# Patient Record
Sex: MALE | Race: WHITE | Employment: FULL TIME | URBAN - METROPOLITAN AREA
[De-identification: names, ages, dates, MRNs, and addresses within clinical notes are randomized per-mention and may not be internally consistent; named-entity substitution may affect disease eponyms.]

---

## 2018-12-12 ENCOUNTER — OFFICE VISIT (OUTPATIENT)
Dept: FAMILY MEDICINE CLINIC | Age: 23
End: 2018-12-12
Payer: COMMERCIAL

## 2018-12-12 VITALS
HEIGHT: 71 IN | DIASTOLIC BLOOD PRESSURE: 85 MMHG | HEART RATE: 81 BPM | SYSTOLIC BLOOD PRESSURE: 135 MMHG | OXYGEN SATURATION: 98 % | BODY MASS INDEX: 39.2 KG/M2 | WEIGHT: 280 LBS

## 2018-12-12 DIAGNOSIS — K60.2 RECTAL FISSURE: ICD-10-CM

## 2018-12-12 DIAGNOSIS — R10.30 LOWER ABDOMINAL PAIN: Primary | ICD-10-CM

## 2018-12-12 DIAGNOSIS — K62.5 RECTAL BLEEDING: ICD-10-CM

## 2018-12-12 PROCEDURE — 99203 OFFICE O/P NEW LOW 30 MIN: CPT | Performed by: FAMILY MEDICINE

## 2018-12-12 ASSESSMENT — PATIENT HEALTH QUESTIONNAIRE - PHQ9
SUM OF ALL RESPONSES TO PHQ9 QUESTIONS 1 & 2: 0
SUM OF ALL RESPONSES TO PHQ QUESTIONS 1-9: 0
2. FEELING DOWN, DEPRESSED OR HOPELESS: 0
1. LITTLE INTEREST OR PLEASURE IN DOING THINGS: 0
SUM OF ALL RESPONSES TO PHQ QUESTIONS 1-9: 0

## 2018-12-12 ASSESSMENT — ENCOUNTER SYMPTOMS
ABDOMINAL PAIN: 1
SHORTNESS OF BREATH: 0
ANAL BLEEDING: 1
COUGH: 0

## 2018-12-12 NOTE — PROGRESS NOTES
Subjective:      Patient ID: Mark Gates is a 21 y.o. male. 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 No pertinent past medical history. Review of Systems    Review of Systems   Constitutional: Negative for unexpected weight change. HENT: Negative for congestion and postnasal drip. Respiratory: Negative for cough and shortness of breath. Cardiovascular: Negative for chest pain and palpitations. Gastrointestinal: Positive for abdominal pain and anal bleeding. Genitourinary: Negative for difficulty urinating and hematuria. Musculoskeletal: Negative for arthralgias. Neurological: Negative for tremors and headaches. Psychiatric/Behavioral: Negative for sleep disturbance. The patient is not nervous/anxious. Objective:   Physical Exam     Physical Exam   Constitutional: He is oriented to person, place, and time. HENT:   Mouth/Throat: Oropharynx is clear and moist.   Eyes: Conjunctivae are normal.   Neck: Neck supple. No thyromegaly present. Cardiovascular: Normal rate, regular rhythm and normal heart sounds. Pulmonary/Chest: Effort normal and breath sounds normal.   Abdominal: Soft. He exhibits no distension and no mass. There is no tenderness. Musculoskeletal: Normal range of motion. Lymphadenopathy:     He has no cervical adenopathy. Neurological: He is alert and oriented to person, place, and time. Skin: Skin is warm and dry. Psychiatric: He has a normal mood and affect. His behavior is normal. Judgment and thought content normal.       Assessment:      1. Lower abdominal pain    2. Rectal bleeding    3. Rectal fissure              Plan:      Jorge Herr was seen today for established new doctor and gi problem.     Diagnoses and all orders for this visit:    Lower abdominal pain   Senna-take 1 daily-call me 10 days  Rectal bleeding  Rx HC cream twice  Rectal fissure  As above  Refer to rectal Sx              Joshua Parson, DO

## 2018-12-12 NOTE — PATIENT INSTRUCTIONS
Instructions. \"     If you do not have an account, please click on the \"Sign Up Now\" link. Current as of: March 28, 2018  Content Version: 11.8  © 6233-3490 [employer]. Care instructions adapted under license by [facility] [place] ([facility]). If you have questions about a medical condition or this instruction, always ask your healthcare professional. [address] any warranty or liability for your use of this information.

## 2023-10-13 ENCOUNTER — OFFICE VISIT (OUTPATIENT)
Age: 28
End: 2023-10-13
Payer: COMMERCIAL

## 2023-10-13 VITALS
BODY MASS INDEX: 42.29 KG/M2 | HEIGHT: 71 IN | HEART RATE: 94 BPM | DIASTOLIC BLOOD PRESSURE: 88 MMHG | WEIGHT: 302.1 LBS | SYSTOLIC BLOOD PRESSURE: 138 MMHG | OXYGEN SATURATION: 97 %

## 2023-10-13 DIAGNOSIS — Z00.00 HEALTHCARE MAINTENANCE: ICD-10-CM

## 2023-10-13 DIAGNOSIS — F41.9 ANXIETY: Primary | ICD-10-CM

## 2023-10-13 NOTE — PROGRESS NOTES
"    Office Note     Name: Dean Wade    : 1995     MRN: [id number]     Chief Complaint  Anxiety and Depression    Subjective     History of Present Illness:  Dean Wade is a 28 y.o. male who presents today for initial visit to establish care chronic anxiety and for his preventative health care.  Patient reports debilitating anxiety that limit his function in life as well as some more mild depression symptoms.  Denies any SI.  His anxiety is on most days and is more generalized than episodic.    Past Medical History:   Past Medical History:   Diagnosis Date    Anxiety     Hyperlipidemia        Past Surgical History: History reviewed. No pertinent surgical history.    Immunizations:   There is no immunization history on file for this patient.     Medications:     Current Outpatient Medications:     sertraline (ZOLOFT) 50 MG tablet, Take 1 tablet by mouth Daily., Disp: 60 tablet, Rfl: 3    Allergies:   No Known Allergies    Family History:   Family History   Problem Relation Age of Onset    Heart disease Maternal Grandmother        Social History:   Social History     Socioeconomic History    Marital status:    Tobacco Use    Smoking status: Never    Smokeless tobacco: Never   Vaping Use    Vaping Use: Never used   Substance and Sexual Activity    Alcohol use: Not Currently    Drug use: Never    Sexual activity: Yes     Partners: Female     Birth control/protection: None         Objective     Vital Signs  /88   Pulse 94   Ht 181 cm (71.26\")   Wt (!) 137 kg (302 lb 1.6 oz)   SpO2 97%   BMI 41.83 kg/mý   Estimated body mass index is 41.83 kg/mý as calculated from the following:    Height as of this encounter: 181 cm (71.26\").    Weight as of this encounter: 137 kg (302 lb 1.6 oz).    Class 3 Severe Obesity (BMI >=40).  Not fully addressed at this visit      Physical Exam  Constitutional:       General: He is not in acute distress.     Appearance: He is not toxic-appearing. "   Cardiovascular:      Rate and Rhythm: Normal rate and regular rhythm.      Heart sounds: No murmur heard.     No friction rub. No gallop.   Pulmonary:      Effort: Pulmonary effort is normal.      Breath sounds: Normal breath sounds.   Abdominal:      General: Abdomen is flat. There is no distension.   Skin:     General: Skin is warm and dry.   Neurological:      Mental Status: He is alert.   Psychiatric:         Mood and Affect: Mood normal.         Behavior: Behavior normal.          Assessment and Plan     1. Anxiety  Chronic, uncontrolled  -No SI, some mild depression  -We will start Zoloft 50 mg, patient instructed that he can increase to 100 mg prior to next appointment if he is tolerating this dose well after couple weeks  -Counseled him on this medication and its side effects  - sertraline (ZOLOFT) 50 MG tablet; Take 1 tablet by mouth Daily.  Dispense: 60 tablet; Refill: 3       Counseling was given to patient for the following topics: instructions for management.    Follow Up  Return in about 6 weeks (around 11/24/2023) for Follow Up.    MD ERMA [facility]  [address] [address]  [phone]

## 2023-11-16 DIAGNOSIS — F41.9 ANXIETY: ICD-10-CM

## 2023-11-16 NOTE — TELEPHONE ENCOUNTER
Caller: Dean Wade    Relationship: Self    Best call back number: [phone]    Requested Prescriptions:   Requested Prescriptions     Pending Prescriptions Disp Refills    sertraline (ZOLOFT) 50 MG tablet 60 tablet 3     Sig: Take 1 tablet by mouth Daily.        Pharmacy where request should be sent: [facility] [id number] [phone] [facility] [phone]      Last office visit with prescribing clinician: Visit date not found   Last telemedicine visit with prescribing clinician: Visit date not found   Next office visit with prescribing clinician: 11/29/2023     Additional details provided by patient: PT STATES THAT WHEN HE TALKED TO ARABELLA ABOUT THIS MEDICATION ARABELLA SAID HE COULD DOUBLE IT. HE HAS DONE THIS AND HE NEEDS 100MG FOR 90 DAY REFILL. PT HAS 1 DAY LEFT OF THE MEDICATION    Does the patient have less than a 3 day supply:  [x] Yes  [] No    Would you like a call back once the refill request has been completed: [x] Yes [] No    If the office needs to give you a call back, can they leave a voicemail: [] Yes [] No    Shirley Nevarez Rep   11/16/23 12:41 EST

## 2023-11-17 RX ORDER — SERTRALINE HYDROCHLORIDE 100 MG/1
100 TABLET, FILM COATED ORAL DAILY
Qty: 90 TABLET | Refills: 3 | Status: SHIPPED | OUTPATIENT
Start: 2023-11-17

## 2023-11-28 ENCOUNTER — TELEPHONE (OUTPATIENT)
Age: 28
End: 2023-11-28
Payer: COMMERCIAL

## 2023-11-28 NOTE — TELEPHONE ENCOUNTER
RHEA TO RELAY  Called patient about appointment tomorrow with Barry, patient has not been seen by Barry before he has established care with Dr. Zuniga. Wanting to see if he wants to switch to Dr. Pickard schedule.

## 2023-11-29 ENCOUNTER — OFFICE VISIT (OUTPATIENT)
Age: 28
End: 2023-11-29
Payer: COMMERCIAL

## 2023-11-29 VITALS
BODY MASS INDEX: 41.12 KG/M2 | SYSTOLIC BLOOD PRESSURE: 114 MMHG | TEMPERATURE: 97.7 F | DIASTOLIC BLOOD PRESSURE: 72 MMHG | HEART RATE: 91 BPM | WEIGHT: 297 LBS | OXYGEN SATURATION: 96 %

## 2023-11-29 DIAGNOSIS — F41.9 ANXIETY: ICD-10-CM

## 2023-11-29 NOTE — PROGRESS NOTES
"    Office Note     Name: Dean Wade    : 1995     MRN: [id number]     Chief Complaint  Anxiety (Med helping anxiety but affecting libido )    Subjective     History of Present Illness:  Dean Wade is a 28 y.o. male who presents today for follow-up of anxiety and depression.  He has been doing much better on sertraline although he notes side effects of decreased libido and some forgetfulness.  He did increase the dose to 100 mg as we previously discussed.  He does not feel that he needs a dose increase at this time    Past Medical History:   Past Medical History:   Diagnosis Date    Anxiety     Hyperlipidemia        Past Surgical History: History reviewed. No pertinent surgical history.    Immunizations:   There is no immunization history on file for this patient.     Medications:     Current Outpatient Medications:     sertraline (ZOLOFT) 100 MG tablet, Take 1 tablet by mouth Daily., Disp: 90 tablet, Rfl: 3    Allergies:   No Known Allergies    Family History:   Family History   Problem Relation Age of Onset    Heart disease Maternal Grandmother        Social History:   Social History     Socioeconomic History    Marital status:    Tobacco Use    Smoking status: Never    Smokeless tobacco: Never   Vaping Use    Vaping Use: Never used   Substance and Sexual Activity    Alcohol use: Not Currently    Drug use: Never    Sexual activity: Yes     Partners: Female     Birth control/protection: None         Objective     Vital Signs  /72   Pulse 91   Temp 97.7 °F (36.5 °C)   Wt 135 kg (297 lb)   SpO2 96%   BMI 41.12 kg/m²   Estimated body mass index is 41.12 kg/m² as calculated from the following:    Height as of 10/13/23: 181 cm (71.26\").    Weight as of this encounter: 135 kg (297 lb).      Physical Exam  Constitutional:       General: He is not in acute distress.     Appearance: He is not toxic-appearing.   Pulmonary:      Effort: Pulmonary effort is normal. No respiratory distress. "   Abdominal:      General: Abdomen is flat. There is no distension.   Skin:     General: Skin is warm and dry.   Neurological:      Mental Status: He is alert.   Psychiatric:         Mood and Affect: Mood normal.         Behavior: Behavior normal.          Assessment and Plan     1. Anxiety  Continue sertraline 100 mg.  Symptoms are well controlled at present although he is having some medication side effects including decreased libido and forgetfulness       Counseling was given to patient for the following topics: instructions for management.    Follow Up  Return in about 6 months (around 5/29/2024) for Annual physical.    MD ERMA Jaime [facility]  [address] [address]  [phone]